# Patient Record
Sex: MALE | Race: BLACK OR AFRICAN AMERICAN | NOT HISPANIC OR LATINO | Employment: OTHER | URBAN - METROPOLITAN AREA
[De-identification: names, ages, dates, MRNs, and addresses within clinical notes are randomized per-mention and may not be internally consistent; named-entity substitution may affect disease eponyms.]

---

## 2018-10-31 ENCOUNTER — RECORDS - HEALTHEAST (OUTPATIENT)
Dept: LAB | Facility: CLINIC | Age: 63
End: 2018-10-31

## 2018-10-31 LAB
ANION GAP SERPL CALCULATED.3IONS-SCNC: 11 MMOL/L (ref 5–18)
BNP SERPL-MCNC: <10 PG/ML (ref 0–56)
BUN SERPL-MCNC: 9 MG/DL (ref 8–22)
CALCIUM SERPL-MCNC: 9.6 MG/DL (ref 8.5–10.5)
CHLORIDE BLD-SCNC: 104 MMOL/L (ref 98–107)
CO2 SERPL-SCNC: 21 MMOL/L (ref 22–31)
CREAT SERPL-MCNC: 0.83 MG/DL (ref 0.7–1.3)
GFR SERPL CREATININE-BSD FRML MDRD: >60 ML/MIN/1.73M2
GLUCOSE BLD-MCNC: 88 MG/DL (ref 70–125)
MAGNESIUM SERPL-MCNC: 2.3 MG/DL (ref 1.8–2.6)
POTASSIUM BLD-SCNC: 4.1 MMOL/L (ref 3.5–5)
SODIUM SERPL-SCNC: 136 MMOL/L (ref 136–145)

## 2018-11-01 ENCOUNTER — RECORDS - HEALTHEAST (OUTPATIENT)
Dept: ADMINISTRATIVE | Facility: OTHER | Age: 63
End: 2018-11-01

## 2018-11-01 ENCOUNTER — AMBULATORY - HEALTHEAST (OUTPATIENT)
Dept: CARDIOLOGY | Facility: CLINIC | Age: 63
End: 2018-11-01

## 2018-11-02 ENCOUNTER — RECORDS - HEALTHEAST (OUTPATIENT)
Dept: LAB | Facility: CLINIC | Age: 63
End: 2018-11-02

## 2018-11-02 LAB
FERRITIN SERPL-MCNC: 19 NG/ML (ref 27–300)
IRON SATN MFR SERPL: 10 % (ref 20–50)
IRON SERPL-MCNC: 44 UG/DL (ref 42–175)
TIBC SERPL-MCNC: 434 UG/DL (ref 313–563)
TRANSFERRIN SERPL-MCNC: 347 MG/DL (ref 212–360)

## 2018-11-21 ENCOUNTER — RECORDS - HEALTHEAST (OUTPATIENT)
Dept: LAB | Facility: CLINIC | Age: 63
End: 2018-11-21

## 2018-11-21 LAB — IRON SERPL-MCNC: 29 UG/DL (ref 42–175)

## 2018-11-22 LAB
BASOPHILS # BLD AUTO: 0 THOU/UL (ref 0–0.2)
BASOPHILS NFR BLD AUTO: 1 % (ref 0–2)
EOSINOPHIL # BLD AUTO: 0.1 THOU/UL (ref 0–0.4)
EOSINOPHIL NFR BLD AUTO: 2 % (ref 0–6)
ERYTHROCYTE [DISTWIDTH] IN BLOOD BY AUTOMATED COUNT: 14.5 % (ref 11–14.5)
HCT VFR BLD AUTO: 37 % (ref 40–54)
HGB BLD-MCNC: 11.9 G/DL (ref 14–18)
LYMPHOCYTES # BLD AUTO: 1.4 THOU/UL (ref 0.8–4.4)
LYMPHOCYTES NFR BLD AUTO: 28 % (ref 20–40)
MCH RBC QN AUTO: 27.7 PG (ref 27–34)
MCHC RBC AUTO-ENTMCNC: 32.2 G/DL (ref 32–36)
MCV RBC AUTO: 86 FL (ref 80–100)
MONOCYTES # BLD AUTO: 0.5 THOU/UL (ref 0–0.9)
MONOCYTES NFR BLD AUTO: 10 % (ref 2–10)
NEUTROPHILS # BLD AUTO: 2.9 THOU/UL (ref 2–7.7)
NEUTROPHILS NFR BLD AUTO: 59 % (ref 50–70)
PATH REPORT.MICROSCOPIC SPEC OTHER STN: ABNORMAL
PLATELET # BLD AUTO: 313 THOU/UL (ref 140–440)
PMV BLD AUTO: 10.2 FL (ref 8.5–12.5)
RBC # BLD AUTO: 4.29 MILL/UL (ref 4.4–6.2)
WBC: 4.9 THOU/UL (ref 4–11)

## 2018-11-23 ENCOUNTER — OFFICE VISIT - HEALTHEAST (OUTPATIENT)
Dept: CARDIOLOGY | Facility: CLINIC | Age: 63
End: 2018-11-23

## 2018-11-23 DIAGNOSIS — R07.9 CHEST PAIN, UNSPECIFIED TYPE: ICD-10-CM

## 2018-11-23 DIAGNOSIS — Z87.891 PERSONAL HISTORY OF TOBACCO USE, PRESENTING HAZARDS TO HEALTH: ICD-10-CM

## 2018-11-23 DIAGNOSIS — R06.09 DYSPNEA ON EXERTION: ICD-10-CM

## 2018-11-23 LAB
LAB AP CHARGES (HE HISTORICAL CONVERSION): NORMAL
PATH REPORT.COMMENTS IMP SPEC: NORMAL
PATH REPORT.COMMENTS IMP SPEC: NORMAL
PATH REPORT.FINAL DX SPEC: NORMAL
PATH REPORT.RELEVANT HX SPEC: NORMAL

## 2018-11-23 RX ORDER — ALBUTEROL SULFATE 90 UG/1
2 AEROSOL, METERED RESPIRATORY (INHALATION) DAILY PRN
Status: SHIPPED | COMMUNITY
Start: 2018-02-12

## 2018-11-23 RX ORDER — OMEPRAZOLE 40 MG/1
40 CAPSULE, DELAYED RELEASE ORAL DAILY
Refills: 4 | Status: SHIPPED | COMMUNITY
Start: 2018-11-21

## 2018-11-23 ASSESSMENT — MIFFLIN-ST. JEOR: SCORE: 1346.45

## 2018-11-26 ENCOUNTER — RECORDS - HEALTHEAST (OUTPATIENT)
Dept: LAB | Facility: CLINIC | Age: 63
End: 2018-11-26

## 2018-11-26 LAB — VIT B12 SERPL-MCNC: 409 PG/ML (ref 213–816)

## 2018-12-11 ENCOUNTER — HOSPITAL ENCOUNTER (OUTPATIENT)
Dept: NUCLEAR MEDICINE | Facility: CLINIC | Age: 63
Discharge: HOME | End: 2018-12-11
Attending: INTERNAL MEDICINE

## 2018-12-11 ENCOUNTER — HOSPITAL ENCOUNTER (OUTPATIENT)
Dept: CARDIOLOGY | Facility: CLINIC | Age: 63
Discharge: HOME | End: 2018-12-11
Attending: INTERNAL MEDICINE

## 2018-12-11 DIAGNOSIS — R06.09 DYSPNEA ON EXERTION: ICD-10-CM

## 2018-12-11 DIAGNOSIS — R07.9 CHEST PAIN, UNSPECIFIED TYPE: ICD-10-CM

## 2018-12-14 ENCOUNTER — HOSPITAL ENCOUNTER (OUTPATIENT)
Dept: NUCLEAR MEDICINE | Facility: CLINIC | Age: 63
Discharge: HOME | End: 2018-12-14
Attending: INTERNAL MEDICINE

## 2018-12-14 ENCOUNTER — HOSPITAL ENCOUNTER (OUTPATIENT)
Dept: CARDIOLOGY | Facility: CLINIC | Age: 63
Discharge: HOME | End: 2018-12-14
Attending: INTERNAL MEDICINE

## 2018-12-14 DIAGNOSIS — R06.09 DYSPNEA ON EXERTION: ICD-10-CM

## 2018-12-14 DIAGNOSIS — R07.9 CHEST PAIN, UNSPECIFIED TYPE: ICD-10-CM

## 2018-12-14 DIAGNOSIS — R06.09 OTHER FORMS OF DYSPNEA: ICD-10-CM

## 2018-12-14 LAB
CV STRESS CURRENT BP HE: NORMAL
CV STRESS CURRENT HR HE: 101
CV STRESS CURRENT HR HE: 73
CV STRESS CURRENT HR HE: 79
CV STRESS CURRENT HR HE: 80
CV STRESS CURRENT HR HE: 81
CV STRESS CURRENT HR HE: 83
CV STRESS CURRENT HR HE: 83
CV STRESS CURRENT HR HE: 84
CV STRESS CURRENT HR HE: 86
CV STRESS CURRENT HR HE: 93
CV STRESS CURRENT HR HE: 93
CV STRESS CURRENT HR HE: 94
CV STRESS CURRENT HR HE: 95
CV STRESS CURRENT HR HE: 96
CV STRESS CURRENT HR HE: 97
CV STRESS DEVIATION TIME HE: NORMAL
CV STRESS ECHO PERCENT HR HE: NORMAL
CV STRESS EXERCISE STAGE HE: NORMAL
CV STRESS EXERCISE STAGE REACHED HE: NORMAL
CV STRESS FINAL RESTING BP HE: NORMAL
CV STRESS FINAL RESTING HR HE: 79
CV STRESS MAX HR HE: 102
CV STRESS MAX TREADMILL GRADE HE: 0
CV STRESS MAX TREADMILL SPEED HE: 0
CV STRESS PEAK DIA BP HE: NORMAL
CV STRESS PEAK SYS BP HE: NORMAL
CV STRESS PHASE HE: NORMAL
CV STRESS PROTOCOL HE: NORMAL
CV STRESS RESTING PT POSITION HE: NORMAL
CV STRESS RESTING PT POSITION HE: NORMAL
CV STRESS ST DEVIATION AMOUNT HE: NORMAL
CV STRESS ST DEVIATION ELEVATION HE: NORMAL
CV STRESS ST EVELATION AMOUNT HE: NORMAL
CV STRESS TEST TYPE HE: NORMAL
CV STRESS TOTAL STAGE TIME MIN 1 HE: NORMAL
NUC STRESS EJECTION FRACTION: 75 %
STRESS ECHO BASELINE BP: NORMAL
STRESS ECHO CALCULATED PERCENT HR: 65 %
STRESS ECHO LAST STRESS BP: NORMAL
STRESS ECHO LAST STRESS HR: 93

## 2018-12-19 ENCOUNTER — AMBULATORY - HEALTHEAST (OUTPATIENT)
Dept: CARDIOLOGY | Facility: CLINIC | Age: 63
End: 2018-12-19

## 2018-12-19 DIAGNOSIS — R94.39 ABNORMAL CARDIOVASCULAR STRESS TEST: ICD-10-CM

## 2018-12-19 DIAGNOSIS — R07.89 CHEST WALL PAIN: ICD-10-CM

## 2018-12-19 RX ORDER — METOPROLOL SUCCINATE 25 MG/1
25 TABLET, EXTENDED RELEASE ORAL DAILY
Qty: 30 TABLET | Refills: 11 | Status: SHIPPED | OUTPATIENT
Start: 2018-12-19

## 2019-01-18 ENCOUNTER — AMBULATORY - HEALTHEAST (OUTPATIENT)
Dept: CARDIOLOGY | Facility: CLINIC | Age: 64
End: 2019-01-18

## 2019-01-18 ENCOUNTER — RECORDS - HEALTHEAST (OUTPATIENT)
Dept: ADMINISTRATIVE | Facility: OTHER | Age: 64
End: 2019-01-18

## 2019-01-22 ENCOUNTER — OFFICE VISIT - HEALTHEAST (OUTPATIENT)
Dept: CARDIOLOGY | Facility: CLINIC | Age: 64
End: 2019-01-22

## 2019-01-22 DIAGNOSIS — E78.5 HYPERLIPIDEMIA LDL GOAL <70: ICD-10-CM

## 2019-01-22 DIAGNOSIS — I25.118 CORONARY ARTERY DISEASE OF NATIVE ARTERY OF NATIVE HEART WITH STABLE ANGINA PECTORIS (H): ICD-10-CM

## 2019-01-22 RX ORDER — ATORVASTATIN CALCIUM 40 MG/1
40 TABLET, FILM COATED ORAL AT BEDTIME
Qty: 30 TABLET | Refills: 11 | Status: SHIPPED | OUTPATIENT
Start: 2019-01-22

## 2019-01-22 RX ORDER — PREDNISOLONE ACETATE 10 MG/ML
SUSPENSION/ DROPS OPHTHALMIC
Refills: 11 | Status: SHIPPED | COMMUNITY
Start: 2018-12-17

## 2019-01-22 ASSESSMENT — MIFFLIN-ST. JEOR: SCORE: 1341.91

## 2019-06-11 ENCOUNTER — COMMUNICATION - HEALTHEAST (OUTPATIENT)
Dept: ADMINISTRATIVE | Facility: CLINIC | Age: 64
End: 2019-06-11

## 2019-07-18 ENCOUNTER — RECORDS - HEALTHEAST (OUTPATIENT)
Dept: ADMINISTRATIVE | Facility: OTHER | Age: 64
End: 2019-07-18

## 2019-07-22 ENCOUNTER — OFFICE VISIT - HEALTHEAST (OUTPATIENT)
Dept: CARDIOLOGY | Facility: CLINIC | Age: 64
End: 2019-07-22

## 2019-07-22 DIAGNOSIS — I25.10 CORONARY ARTERY DISEASE INVOLVING NATIVE CORONARY ARTERY OF NATIVE HEART WITHOUT ANGINA PECTORIS: ICD-10-CM

## 2019-07-22 DIAGNOSIS — E78.5 HYPERLIPIDEMIA LDL GOAL <70: ICD-10-CM

## 2019-07-22 DIAGNOSIS — Z87.891 PERSONAL HISTORY OF TOBACCO USE, PRESENTING HAZARDS TO HEALTH: ICD-10-CM

## 2019-07-22 ASSESSMENT — MIFFLIN-ST. JEOR: SCORE: 1332.84

## 2019-11-18 ENCOUNTER — RECORDS - HEALTHEAST (OUTPATIENT)
Dept: ADMINISTRATIVE | Facility: OTHER | Age: 64
End: 2019-11-18

## 2019-11-20 ENCOUNTER — RECORDS - HEALTHEAST (OUTPATIENT)
Dept: ADMINISTRATIVE | Facility: OTHER | Age: 64
End: 2019-11-20

## 2020-03-13 ENCOUNTER — RECORDS - HEALTHEAST (OUTPATIENT)
Dept: LAB | Facility: CLINIC | Age: 65
End: 2020-03-13

## 2020-03-13 LAB
ALBUMIN SERPL-MCNC: 3.8 G/DL (ref 3.5–5)
ALP SERPL-CCNC: 92 U/L (ref 45–120)
ALT SERPL W P-5'-P-CCNC: 13 U/L (ref 0–45)
ANION GAP SERPL CALCULATED.3IONS-SCNC: 8 MMOL/L (ref 5–18)
AST SERPL W P-5'-P-CCNC: 20 U/L (ref 0–40)
BASOPHILS # BLD AUTO: 0 THOU/UL (ref 0–0.2)
BASOPHILS NFR BLD AUTO: 1 % (ref 0–2)
BILIRUB SERPL-MCNC: 0.3 MG/DL (ref 0–1)
BUN SERPL-MCNC: 8 MG/DL (ref 8–22)
CALCIUM SERPL-MCNC: 8.5 MG/DL (ref 8.5–10.5)
CHLORIDE BLD-SCNC: 104 MMOL/L (ref 98–107)
CO2 SERPL-SCNC: 24 MMOL/L (ref 22–31)
CREAT SERPL-MCNC: 0.78 MG/DL (ref 0.7–1.3)
EOSINOPHIL # BLD AUTO: 0.2 THOU/UL (ref 0–0.4)
EOSINOPHIL NFR BLD AUTO: 5 % (ref 0–6)
ERYTHROCYTE [DISTWIDTH] IN BLOOD BY AUTOMATED COUNT: 17.9 % (ref 11–14.5)
GFR SERPL CREATININE-BSD FRML MDRD: >60 ML/MIN/1.73M2
GLUCOSE BLD-MCNC: 84 MG/DL (ref 70–125)
HCT VFR BLD AUTO: 25.6 % (ref 40–54)
HGB BLD-MCNC: 7.5 G/DL (ref 14–18)
IRON SATN MFR SERPL: 2 % (ref 20–50)
IRON SERPL-MCNC: 7 UG/DL (ref 42–175)
LIPASE SERPL-CCNC: 45 U/L (ref 0–52)
LYMPHOCYTES # BLD AUTO: 1.2 THOU/UL (ref 0.8–4.4)
LYMPHOCYTES NFR BLD AUTO: 34 % (ref 20–40)
MCH RBC QN AUTO: 20.8 PG (ref 27–34)
MCHC RBC AUTO-ENTMCNC: 29.3 G/DL (ref 32–36)
MCV RBC AUTO: 71 FL (ref 80–100)
MONOCYTES # BLD AUTO: 0.4 THOU/UL (ref 0–0.9)
MONOCYTES NFR BLD AUTO: 12 % (ref 2–10)
NEUTROPHILS # BLD AUTO: 1.6 THOU/UL (ref 2–7.7)
NEUTROPHILS NFR BLD AUTO: 48 % (ref 50–70)
PLATELET # BLD AUTO: 304 THOU/UL (ref 140–440)
PMV BLD AUTO: 9.9 FL (ref 8.5–12.5)
POTASSIUM BLD-SCNC: 4.2 MMOL/L (ref 3.5–5)
PROT SERPL-MCNC: 6.7 G/DL (ref 6–8)
RBC # BLD AUTO: 3.61 MILL/UL (ref 4.4–6.2)
SODIUM SERPL-SCNC: 136 MMOL/L (ref 136–145)
TIBC SERPL-MCNC: 454 UG/DL (ref 313–563)
TRANSFERRIN SERPL-MCNC: 369 MG/DL (ref 212–360)
WBC: 3.4 THOU/UL (ref 4–11)

## 2020-03-18 ENCOUNTER — RECORDS - HEALTHEAST (OUTPATIENT)
Dept: LAB | Facility: CLINIC | Age: 65
End: 2020-03-18

## 2020-03-18 LAB — HGB BLD-MCNC: 9 G/DL (ref 14–18)

## 2020-03-19 LAB
GLIADIN IGA SER-ACNC: 0.7 U/ML
GLIADIN IGG SER-ACNC: 6.3 U/ML
IGA SERPL-MCNC: 229 MG/DL (ref 65–400)
TTG IGA SER-ACNC: 0.4 U/ML
TTG IGG SER-ACNC: <0.6 U/ML

## 2020-11-02 ENCOUNTER — RECORDS - HEALTHEAST (OUTPATIENT)
Dept: LAB | Facility: CLINIC | Age: 65
End: 2020-11-02

## 2020-11-02 LAB
CHOLEST SERPL-MCNC: 148 MG/DL
FASTING STATUS PATIENT QL REPORTED: NO
HDLC SERPL-MCNC: 47 MG/DL
IRON SATN MFR SERPL: 20 % (ref 20–50)
IRON SERPL-MCNC: 59 UG/DL (ref 42–175)
LDLC SERPL CALC-MCNC: 90 MG/DL
TIBC SERPL-MCNC: 295 UG/DL (ref 313–563)
TRANSFERRIN SERPL-MCNC: 236 MG/DL (ref 212–360)
TRIGL SERPL-MCNC: 53 MG/DL

## 2021-05-30 NOTE — PATIENT INSTRUCTIONS - HE
Below is a list of instructions we discussed today in clinic:   1. Continue taking your heart medications which should be the following  1. Atorvastatin 40 mg daily  2. Metoprolol succinate 25 mg daily  3. Aspirin 81 mg daily (baby aspirin)  2. If you do not have these medications at home your pharmacy should have an active prescription.  3. STOP smoking. This is the biggest harm to your health that you have at this time.  4. Follow up with me in about a year or sooner if needed.    You should receive a phone call from this office informing you of test or procedure results within 3 business days of the test being performed.  If you do not hear from our office with the test results within 1 week please do not hesitate to call asking for these results.     It was a pleasure to meet with you today in clinic.  Please do not hesitate to call the [facility] clinic with any questions or concerns at [phone].    Sincerely,     Mumtaz Bowens MD  Non-invasive Cardiology  [facility]

## 2021-06-02 ENCOUNTER — RECORDS - HEALTHEAST (OUTPATIENT)
Dept: ADMINISTRATIVE | Facility: CLINIC | Age: 66
End: 2021-06-02

## 2021-06-02 VITALS — BODY MASS INDEX: 18.81 KG/M2 | WEIGHT: 127 LBS | HEIGHT: 69 IN

## 2021-06-02 VITALS — HEIGHT: 69 IN | WEIGHT: 126 LBS | BODY MASS INDEX: 18.66 KG/M2

## 2021-06-03 VITALS — BODY MASS INDEX: 18.37 KG/M2 | WEIGHT: 124 LBS | HEIGHT: 69 IN

## 2021-06-16 PROBLEM — I49.9 CARDIAC ARRHYTHMIA: Status: ACTIVE | Noted: 2019-11-12

## 2021-06-16 PROBLEM — Z72.0 TOBACCO USER: Status: ACTIVE | Noted: 2019-11-12

## 2021-06-16 PROBLEM — K21.9 GASTROESOPHAGEAL REFLUX DISEASE WITHOUT ESOPHAGITIS: Status: ACTIVE | Noted: 2019-11-12

## 2021-06-19 NOTE — LETTER
Letter by Mumtaz Bowens MD at      Author: Mumtaz Bowens MD Service: -- Author Type: --    Filed:  Encounter Date: 6/11/2019 Status: (Other)         Leon Wayne  [address]  [place] [address]      June 11, 2019      Dear Leon,    This letter is to remind you that you will be due for your follow up appointment with Dr. Mumtaz Bowens. To help ensure you are in the best health possible, a regular follow-up with your cardiologist is essential.     Please call our Patient Scheduling Line at [phone] to schedule your appointment at your earliest convenience.  If you have recently scheduled an appointment, please disregard this letter.    We look forward to seeing you again. As always, we are available at the number  above for any questions or concerns you may have.      Sincerely,     The Physicians and Staff of [facility]

## 2021-06-23 NOTE — PATIENT INSTRUCTIONS - HE
Below is a list of instructions we discussed today in clinic:   1. Continue  Metoprolol  2. Start taking aspirin 81 mg once daily.  3. Start taking atorvastatin 40 mg once daily.  4. Come back to see me in 6 months or sooner if needed with a cholesterol test before your appointment.    It was a pleasure to meet with you today in clinic.  Please do not hesitate to call the [facility] clinic with any questions or concerns at [phone].    Sincerely,     Mumtaz Bowens MD  Non-invasive Cardiology  [facility]

## 2021-06-26 NOTE — PROGRESS NOTES
Progress Notes by Mumtaz Bowens MD at 11/23/2018  9:10 AM     Author: Mumtaz Bowens MD Service: -- Author Type: Physician    Filed: 11/23/2018 10:55 AM Encounter Date: 11/23/2018 Status: Signed    : Mumtaz Bowens MD (Physician)           Click to link to [facility]     [facility] HEART CARE NOTE    Thank you, Dr. Zelalem Hernandez MD, for asking the [facility] team to see Mr. Leon Wayne to evaluate Consult chest pain and abnormal ECG.      Assessment/Recommendations   Assessment:    1. Chest pain with abnormal ECG  2. Dyspnea on exertion  3. Ongoing tobacco use    Plan:  1. Exercise nuclear stress test  2. Recommend checking fasting lipids -the patient is not fasting this morning so I will defer this to Dr. Hernandez.  3. Recommended smoking cessation.  4. Follow up pending abnormal stress test. Would scheduled in clinic prior to direct referral for angiography due to language barrier.  5. If stress test is reassuring would pursue evaluation for COPD with pulmonary function testing.             History of Present Illness   Mr. Leon Wayne is a 63 y.o. male with a significant past history of tobacco use and COPD who presents for evaluation of chest pain and an abnormal ECG.     Mr. Wayne presents with a professional [employer] . The patient's english is quite good and he understands most of the conversation without translation necessary. He reports that his chest pain started a few months ago. It is described as burning.  He is short of breath with climbing 1-2 flights of stairs, however he does not necessarily get chest pain with the exertion.  Over the past couple months he is feeling better now than he did when the discomfort started with fewer symptoms currently.  The pain is not radiating.  Not associated with lightheadedness or dizziness, palpitations, or nausea.  He does smoke about a pack a day and has done so for more than 40 years.  He does not  have any known cardiac disease previously diagnosed.      Other than noted above, Mr. Wayne denies any chest pain/pressure/tightness, shortness of breath at rest or with exertion, light headedness/dizziness, pre-syncope, syncope, lower extremity swelling, palpitations, paroxysmal nocturnal dyspnea (PND), or orthopnea.     Cardiac Problems and Cardiac Diagnostics   Most Recent Cardiac testing:  ECG dated 10/31/18 (personaly reviewed and interpreted): sinus rhythm with nonspecific T wave changes in lead V2.    ECHO (report reviewed): no prior echocardiogram.  Echo results:      Stress test: no prior stress test         Medications  Allergies   Current Outpatient Medications   Medication Sig Dispense Refill   ? albuterol (PROAIR HFA;PROVENTIL HFA;VENTOLIN HFA) 90 mcg/actuation inhaler Inhale 2 puffs daily as needed.     ? aspirin-acetaminophen-caffeine (EXCEDRIN MIGRAINE) 250-250-65 mg per tablet Take 1 tablet by mouth every 6 (six) hours as needed for pain.     ? omeprazole (PRILOSEC) 40 MG capsule Take 40 mg by mouth daily.  4   ? dextromethorphan (DELSYM) 30 mg/5 mL liquid Take 10 mL (60 mg total) by mouth 2 (two) times a day. 148 mL 0   ? naproxen (NAPROSYN) 375 MG tablet Take 1 tablet (375 mg total) by mouth 2 (two) times a day with meals. 20 tablet 0   ? oxyCODONE-acetaminophen (PERCOCET/ENDOCET) 5-325 mg per tablet Take 1 tablet by mouth every 4 (four) hours as needed for pain. 13 tablet 0     No current facility-administered medications for this visit.       No Known Allergies     Physical Examination Review of Systems   Vitals:    11/23/18 0921   BP: 144/80   Pulse: 66   Resp: 16     Body mass index is 18.75 kg/m .  Wt Readings from Last 3 Encounters:   11/23/18 127 lb (57.6 kg)   10/19/18 120 lb (54.4 kg)       General Appearance:   Pleasant  male, appears older than stated age. no acute distress, thin body habitus   ENT/Mouth: membranes moist, no apparent gingival bleeding.      EYES:  no scleral icterus,  normal conjunctivae   Neck: no carotid bruits. No anterior cervical lymphadenopaty   Respiratory:   lungs are clear to auscultation, no rales or wheezing, equal chest wall expansion    Cardiovascular:   Regular rhythm, normal rate. Normal first and second heart sounds with no murmurs, rubs, or gallops; the carotid, radial and posterior tibial pulses are intact, Jugular venous pressure normal, no edema bilaterally    Abdomen/GI:  no organomegaly, masses, bruits, or tenderness; bowel sounds are present   Extremities: no cyanosis or clubbing   Skin: no xanthelasma, warm.    Heme/lymph/ Immunology No apparent bleeding noted.   Neurologic: Alert and oriented. normal gait, no tremors     Psychiatric: Pleasant, calm, appropriate affect.    A complete 10 system review of systems was performed and is negative except as mentioned in the HPI or below:  General: WNL  Eyes: WNL  Ears/Nose/Throat: WNL  Lungs: Shortness of Breath, Wheezing  Heart: Chest Pain, Shortness of Breath with activity, Irregular Heartbeat  Stomach: Constipation, Heartburn  Bladder: WNL  Muscle/Joints: WNL  Skin: WNL  Nervous System: WNL  Mental Health: WNL     Blood: WNL       Past History   Past Medical History:   Past Medical History:   Diagnosis Date   ? COPD (chronic obstructive pulmonary disease) (H)        Past Surgical History:   Past Surgical History:   Procedure Laterality Date   ? EYE SURGERY Left        Family History:   Family History   Problem Relation Age of Onset   ? No Medical Problems Mother    ? No Medical Problems Father        Social History:   Social History     Socioeconomic History   ? Marital status: Single     Spouse name: Not on file   ? Number of children: Not on file   ? Years of education: Not on file   ? Highest education level: Not on file   Social Needs   ? Financial resource strain: Not on file   ? Food insecurity - worry: Not on file   ? Food insecurity - inability: Not on file   ? Transportation needs - medical: Not on  file   ? Transportation needs - non-medical: Not on file   Occupational History   ? Not on file   Tobacco Use   ? Smoking status: Not on file   Substance and Sexual Activity   ? Alcohol use: Not on file   ? Drug use: Not on file   ? Sexual activity: Not on file   Other Topics Concern   ? Not on file   Social History Narrative   ? Not on file              Lab Results    Chemistry/lipid CBC Cardiac Enzymes/BNP/TSH/INR   Lab Results   Component Value Date    CHOL 158 09/21/2010    HDL 50 09/21/2010    LDLCALC 85.8 09/21/2010    TRIG 111 09/21/2010    CREATININE 0.83 10/31/2018    BUN 9 10/31/2018    K 4.1 10/31/2018     10/31/2018     10/31/2018    CO2 21 (L) 10/31/2018    Lab Results   Component Value Date    WBC 4.9 11/21/2018    HGB 11.9 (L) 11/21/2018    HCT 37.0 (L) 11/21/2018    MCV 86 11/21/2018     11/21/2018    Lab Results   Component Value Date    BNP <10 10/31/2018    TSH 2.15 09/21/2010          Mumtaz Bowens MD  Non-invasive Cardiology  [facility]

## 2021-06-27 NOTE — PROGRESS NOTES
Progress Notes by Mumtaz Bowens MD at 1/22/2019 10:10 AM     Author: Mumtaz Bowens MD Service: -- Author Type: Physician    Filed: 1/22/2019 11:30 AM Encounter Date: 1/22/2019 Status: Signed    : Mumtaz Bowens MD (Physician)           Click to link to [facility]     [facility] HEART CARE NOTE    Thank you, Zelalem Coleman MD, for allowing the [facility] team to continue to follow Mr. Leon Wayne in Follow-up for his coronary artery disease.      Assessment/Recommendations   Assessment:    1. Coronary artery disease with low-risk but abnormal pharmacologic nuclear stress test.  I am considering his dyspnea on exertion in part an anginal equivalent  2. Dyspnea on exertion -likely multifactorial as an anginal equivalent as well as with chronic obstructive pulmonary disease.  3. Tobacco abuse -again discussed and strongly recommended tobacco cessation.  4. hyperlipidemia    Plan:  1. Aspirin 81 mg daily  2. Continue metoprolol  3. Start atorvastatin 40 mg daily  4. Check fasting lipids and ALT in 6 months  5. Follow up in 6 months.           History of Present Illness   Mr. Leon Wayne is a 64 y.o. male with a significant past history of chronic tobacco use and coronary artery diseas who presents for follow-up of a recent abnormal stress test.    I last saw Mr. Wayne on initial consult for exertional dyspnea and chest discomfort. We ordered an exercise nuclear stress test, but this was converted to a pharmacologic test due to significant limitations in exercise and failure to achieved target heart rate. The stress test was mildly abnormal and low risk so medical therapy with aspirin and metoprolol was initiated.     Today, Mr. Wayne presents with a life professional [employer] .  He reports somewhat improved dyspnea on exertion.  This did get a little better after starting the metoprolol.  He has not been taking the aspirin due to questions about whether  it would be too much to take daily aspirin in addition to as needed Excedrin which contains aspirin.  He continues to smoke but indicated that he is willing to consider quitting if recommended.    Other than noted above, Mr. Wayne denies any chest pain/pressure/tightness, shortness of breath at rest or with exertion, light headedness/dizziness, pre-syncope, syncope, lower extremity swelling, palpitations, paroxysmal nocturnal dyspnea (PND), or orthopnea.     Cardiac Problems and Cardiac Diagnostics     Most Recent Cardiac testing:    ECHO (report reviewed): no prior echo  Echo results:      Stress test: dated 12/14/18 revealed     The exercise nuclear stress test is abnormal.    Stress nuclear images reveal a small area of distal inferolateral ischemia, although diaphragmatic attenuation reduces specificity.    The left ventricular ejection fraction is 75% without wall motion abnormality.    There is no prior study available.       Medications  Allergies   Current Outpatient Medications   Medication Sig Dispense Refill   ? albuterol (PROAIR HFA;PROVENTIL HFA;VENTOLIN HFA) 90 mcg/actuation inhaler Inhale 2 puffs daily as needed.     ? aspirin 81 MG EC tablet Take 1 tablet (81 mg total) by mouth daily.  0   ? aspirin-acetaminophen-caffeine (EXCEDRIN MIGRAINE) 250-250-65 mg per tablet Take 1 tablet by mouth every 6 (six) hours as needed for pain.     ? fluticasone furoate 100 mcg/actuation DsDv Inhale 100 mcg daily.     ? metoprolol succinate (TOPROL XL) 25 MG Take 1 tablet (25 mg total) by mouth daily. 30 tablet 11   ? omeprazole (PRILOSEC) 40 MG capsule Take 40 mg by mouth daily.  4   ? prednisoLONE acetate (PRED-FORTE) 1 % ophthalmic suspension SHAKE LQ AND INT 1 GTT IN OS BID  11   ? dextromethorphan (DELSYM) 30 mg/5 mL liquid Take 10 mL (60 mg total) by mouth 2 (two) times a day. 148 mL 0   ? naproxen (NAPROSYN) 375 MG tablet Take 1 tablet (375 mg total) by mouth 2 (two) times a day with meals. 20 tablet 0   ?  oxyCODONE-acetaminophen (PERCOCET/ENDOCET) 5-325 mg per tablet Take 1 tablet by mouth every 4 (four) hours as needed for pain. 13 tablet 0     No current facility-administered medications for this visit.       No Known Allergies     Physical Examination Review of Systems   Vitals:    01/22/19 1033   BP: 108/70   Pulse: 96   Resp: 16     Body mass index is 18.61 kg/m .  Wt Readings from Last 3 Encounters:   01/22/19 126 lb (57.2 kg)   11/23/18 127 lb (57.6 kg)   10/19/18 120 lb (54.4 kg)       General Appearance:   Pleasant male, appears older than stated age. no acute distress, thin body habitus   ENT/Mouth: membranes moist, no apparent gingival bleeding.      EYES:  no scleral icterus, normal conjunctivae   Neck: supple   Respiratory:   lungs are clear to auscultation, no rales or wheezing, equal chest wall expansion    Cardiovascular:   Regular rhythm, normal rate. Normal first and second heart sounds with no murmurs, rubs, or gallops; Jugular venous pressure normal, no edema bilaterally    Abdomen/GI:  no organomegaly, masses, bruits, or tenderness; bowel sounds are present   Extremities: no cyanosis or clubbing   Skin: no xanthelasma, warm.    Heme/lymph/ Immunology No apparent bleeding noted.   Neurologic: Alert and oriented. normal gait, no tremors     Psychiatric: Pleasant, calm, appropriate affect.    A complete 10 system review of systems was performed and is negative except as mentioned in the HPI or below:  General: WNL  Eyes: WNL  Ears/Nose/Throat: WNL  Lungs: WNL  Heart: WNL  Stomach: Heartburn  Bladder: WNL  Muscle/Joints: WNL  Skin: WNL  Nervous System: WNL  Mental Health: WNL     Blood: WNL       Past History   Past Medical History:   Past Medical History:   Diagnosis Date   ? COPD (chronic obstructive pulmonary disease) (H)        Past Surgical History:   Past Surgical History:   Procedure Laterality Date   ? EYE SURGERY Left        Family History:   Family History   Problem Relation Age of Onset    ? No Medical Problems Mother    ? No Medical Problems Father         Social History:   Social History     Socioeconomic History   ? Marital status: Single     Spouse name: Not on file   ? Number of children: Not on file   ? Years of education: Not on file   ? Highest education level: Not on file   Social Needs   ? Financial resource strain: Not on file   ? Food insecurity - worry: Not on file   ? Food insecurity - inability: Not on file   ? Transportation needs - medical: Not on file   ? Transportation needs - non-medical: Not on file   Occupational History   ? Not on file   Tobacco Use   ? Smoking status: Current Every Day Smoker   ? Smokeless tobacco: Never Used   Substance and Sexual Activity   ? Alcohol use: Not on file   ? Drug use: Not on file   ? Sexual activity: Not on file   Other Topics Concern   ? Not on file   Social History Narrative   ? Not on file              Lab Results    Chemistry/lipid CBC Cardiac Enzymes/BNP/TSH/INR   Lab Results   Component Value Date    CHOL 158 09/21/2010    HDL 50 09/21/2010    LDLCALC 85.8 09/21/2010    TRIG 111 09/21/2010    CREATININE 0.83 10/31/2018    BUN 9 10/31/2018    K 4.1 10/31/2018     10/31/2018     10/31/2018    CO2 21 (L) 10/31/2018    Lab Results   Component Value Date    WBC 4.9 11/21/2018    HGB 11.9 (L) 11/21/2018    HCT 37.0 (L) 11/21/2018    MCV 86 11/21/2018     11/21/2018    Lab Results   Component Value Date    BNP <10 10/31/2018    TSH 2.15 09/21/2010          Mumtaz Bowens MD  Non-invasive Cardiology  [facility]

## 2021-06-27 NOTE — PROGRESS NOTES
Progress Notes by Mumtaz Bowens MD at 7/22/2019  1:50 PM     Author: Mumtaz Bowens MD Service: -- Author Type: Physician    Filed: 7/22/2019  2:15 PM Encounter Date: 7/22/2019 Status: Signed    : Mumtaz Bowens MD (Physician)           Click to link to [facility]     [facility] NOTE    Thank you, Zelalem Coleman MD, for allowing the [facility] team to continue to follow Mr. Leon Wayne in Follow-up for his coronary artery disease.      Assessment/Recommendations   Assessment:    1. Coronary artery disease with low-risk but abnormal pharmacologic nuclear stress test.  Currently asymptomatic after initiation of metoprolol  2. Tobacco abuse -again discussed and strongly recommended tobacco cessation.  3. Hyperlipidemia - has not been taking atorvastatin as he was unaware of needing refills.    Plan:  1. Continue Aspirin 81 mg daily  2. Continue metoprolol  3. resume atorvastatin 40 mg daily  4. Advised tobacco cessation.  5. Follow up in 1 year or sooner if needed.    A professional Zymer[employer]  was used for today's encounter.         History of Present Illness   Mr. Leon Wayne is a 64 y.o. male with a significant past history of chronic tobacco use and coronary artery diseas who presents for follow-up of coronary artery disease.    Deyanira Wayne has presumed coronary artery disease with a low risk abnormal stress test and symptoms of exertional dyspnea. We are treating his CAD medically. I last saw him 6 months ago at which time his symptoms were stable.     Today, Mr. Wayne states he is feeling well. His dyspnea on exertion markedly improved after starting metoprolol. He ran out of atorvastatin and was unaware that he had refills so has not taken this in a few months.    Other than noted above, Mr. Wayne denies any chest pain/pressure/tightness, shortness of breath at rest or with exertion, light headedness/dizziness, pre-syncope, syncope, lower  extremity swelling, palpitations, paroxysmal nocturnal dyspnea (PND), or orthopnea.     Cardiac Problems and Cardiac Diagnostics     Most Recent Cardiac testing:    ECHO (report reviewed): no prior echo  Echo results:      Stress test: dated 12/14/18 revealed     The exercise nuclear stress test is abnormal.    Stress nuclear images reveal a small area of distal inferolateral ischemia, although diaphragmatic attenuation reduces specificity.    The left ventricular ejection fraction is 75% without wall motion abnormality.    There is no prior study available.       Medications  Allergies   Current Outpatient Medications   Medication Sig Dispense Refill   ? albuterol (PROAIR HFA;PROVENTIL HFA;VENTOLIN HFA) 90 mcg/actuation inhaler Inhale 2 puffs daily as needed.     ? aspirin 81 MG EC tablet Take 1 tablet (81 mg total) by mouth daily.  0   ? aspirin-acetaminophen-caffeine (EXCEDRIN MIGRAINE) 250-250-65 mg per tablet Take 1 tablet by mouth every 6 (six) hours as needed for pain.     ? atorvastatin (LIPITOR) 40 MG tablet Take 1 tablet (40 mg total) by mouth at bedtime. 30 tablet 11   ? dextromethorphan (DELSYM) 30 mg/5 mL liquid Take 10 mL (60 mg total) by mouth 2 (two) times a day. 148 mL 0   ? fluticasone furoate 100 mcg/actuation DsDv Inhale 100 mcg daily.     ? metoprolol succinate (TOPROL XL) 25 MG Take 1 tablet (25 mg total) by mouth daily. 30 tablet 11   ? naproxen (NAPROSYN) 375 MG tablet Take 1 tablet (375 mg total) by mouth 2 (two) times a day with meals. 20 tablet 0   ? omeprazole (PRILOSEC) 40 MG capsule Take 40 mg by mouth daily.  4   ? oxyCODONE-acetaminophen (PERCOCET/ENDOCET) 5-325 mg per tablet Take 1 tablet by mouth every 4 (four) hours as needed for pain. 13 tablet 0   ? prednisoLONE acetate (PRED-FORTE) 1 % ophthalmic suspension SHAKE LQ AND INT 1 GTT IN OS BID  11     No current facility-administered medications for this visit.       No Known Allergies     Physical Examination Review of Systems    Vitals:    07/22/19 1357   BP: 122/78   Pulse: 76   Resp: 14     Body mass index is 18.31 kg/m .  Wt Readings from Last 3 Encounters:   07/22/19 124 lb (56.2 kg)   01/22/19 126 lb (57.2 kg)   11/23/18 127 lb (57.6 kg)       General Appearance:   Pleasant male, appears older than stated age. no acute distress, thin body habitus   ENT/Mouth: membranes moist, no apparent gingival bleeding.      EYES:  no scleral icterus, normal conjunctivae   Neck: supple   Respiratory:   lungs are clear to auscultation, no rales or wheezing, equal chest wall expansion    Cardiovascular:   Normal rate, regular rhythm with occasional extrasystoles. Normal first and second heart sounds with no murmurs or rubs. Jugular venous pressure normal, no edema bilaterally    Abdomen/GI:  Soft, non-tender   Extremities: no cyanosis or clubbing   Skin: no xanthelasma, warm.    Heme/lymph/ Immunology No apparent bleeding noted.   Neurologic: Alert and oriented. normal gait, no tremors     Psychiatric: Pleasant, calm, appropriate affect.    A complete 10 system review of systems was performed and is negative except as mentioned in the HPI or below:  General: WNL  Eyes: WNL  Ears/Nose/Throat: WNL  Lungs: WNL  Heart: WNL  Stomach: Heartburn  Bladder: WNL  Muscle/Joints: WNL  Skin: WNL  Nervous System: WNL  Mental Health: WNL     Blood: WNL       Past History   Past Medical History:   Past Medical History:   Diagnosis Date   ? COPD (chronic obstructive pulmonary disease) (H)        Past Surgical History:   Past Surgical History:   Procedure Laterality Date   ? EYE SURGERY Left        Family History:   Family History   Problem Relation Age of Onset   ? No Medical Problems Mother    ? No Medical Problems Father         Social History:   Social History     Socioeconomic History   ? Marital status: Single     Spouse name: Not on file   ? Number of children: Not on file   ? Years of education: Not on file   ? Highest education level: Not on file    Occupational History   ? Not on file   Social Needs   ? Financial resource strain: Not on file   ? Food insecurity:     Worry: Not on file     Inability: Not on file   ? Transportation needs:     Medical: Not on file     Non-medical: Not on file   Tobacco Use   ? Smoking status: Current Every Day Smoker   ? Smokeless tobacco: Never Used   Substance and Sexual Activity   ? Alcohol use: Not on file   ? Drug use: Not on file   ? Sexual activity: Not on file   Lifestyle   ? Physical activity:     Days per week: Not on file     Minutes per session: Not on file   ? Stress: Not on file   Relationships   ? Social connections:     Talks on phone: Not on file     Gets together: Not on file     Attends [employer] service: Not on file     Active member of club or organization: Not on file     Attends meetings of clubs or organizations: Not on file     Relationship status: Not on file   ? Intimate partner violence:     Fear of current or ex partner: Not on file     Emotionally abused: Not on file     Physically abused: Not on file     Forced sexual activity: Not on file   Other Topics Concern   ? Not on file   Social History Narrative   ? Not on file              Lab Results    Chemistry/lipid CBC Cardiac Enzymes/BNP/TSH/INR   Lab Results   Component Value Date    CHOL 158 09/21/2010    HDL 50 09/21/2010    LDLCALC 85.8 09/21/2010    TRIG 111 09/21/2010    CREATININE 0.83 10/31/2018    BUN 9 10/31/2018    K 4.1 10/31/2018     10/31/2018     10/31/2018    CO2 21 (L) 10/31/2018    Lab Results   Component Value Date    WBC 4.9 11/21/2018    HGB 11.9 (L) 11/21/2018    HCT 37.0 (L) 11/21/2018    MCV 86 11/21/2018     11/21/2018    Lab Results   Component Value Date    BNP <10 10/31/2018    TSH 2.15 09/21/2010          Mumtaz Bowens MD  Non-invasive Cardiology  [facility]

## 2024-09-06 ENCOUNTER — APPOINTMENT (OUTPATIENT)
Dept: GENERAL RADIOLOGY | Facility: CLINIC | Age: 69
End: 2024-09-06
Attending: EMERGENCY MEDICINE
Payer: MEDICARE

## 2024-09-06 ENCOUNTER — HOSPITAL ENCOUNTER (EMERGENCY)
Facility: CLINIC | Age: 69
Discharge: HOME | End: 2024-09-06
Attending: EMERGENCY MEDICINE | Admitting: EMERGENCY MEDICINE
Payer: MEDICARE

## 2024-09-06 ENCOUNTER — APPOINTMENT (OUTPATIENT)
Dept: CT IMAGING | Facility: CLINIC | Age: 69
End: 2024-09-06
Attending: EMERGENCY MEDICINE
Payer: MEDICARE

## 2024-09-06 VITALS
SYSTOLIC BLOOD PRESSURE: 138 MMHG | DIASTOLIC BLOOD PRESSURE: 88 MMHG | HEART RATE: 89 BPM | OXYGEN SATURATION: 97 % | RESPIRATION RATE: 20 BRPM | TEMPERATURE: 98 F

## 2024-09-06 DIAGNOSIS — K59.00 CONSTIPATION, UNSPECIFIED CONSTIPATION TYPE: ICD-10-CM

## 2024-09-06 DIAGNOSIS — U07.1 COVID-19: ICD-10-CM

## 2024-09-06 LAB
ALBUMIN SERPL BCG-MCNC: 4.3 G/DL (ref 3.5–5.2)
ALP SERPL-CCNC: 95 U/L (ref 40–150)
ALT SERPL W P-5'-P-CCNC: 15 U/L (ref 0–70)
ANION GAP SERPL CALCULATED.3IONS-SCNC: 11 MMOL/L (ref 7–15)
AST SERPL W P-5'-P-CCNC: 21 U/L (ref 0–45)
BASOPHILS # BLD AUTO: 0 10E3/UL (ref 0–0.2)
BASOPHILS NFR BLD AUTO: 1 %
BILIRUB SERPL-MCNC: 0.6 MG/DL
BUN SERPL-MCNC: 10.5 MG/DL (ref 8–23)
CALCIUM SERPL-MCNC: 8.9 MG/DL (ref 8.8–10.4)
CHLORIDE SERPL-SCNC: 100 MMOL/L (ref 98–107)
CREAT SERPL-MCNC: 0.81 MG/DL (ref 0.67–1.17)
EGFRCR SERPLBLD CKD-EPI 2021: >90 ML/MIN/1.73M2
EOSINOPHIL # BLD AUTO: 0 10E3/UL (ref 0–0.7)
EOSINOPHIL NFR BLD AUTO: 0 %
ERYTHROCYTE [DISTWIDTH] IN BLOOD BY AUTOMATED COUNT: 13.4 % (ref 10–15)
FLUAV RNA SPEC QL NAA+PROBE: NEGATIVE
FLUBV RNA RESP QL NAA+PROBE: NEGATIVE
GLUCOSE SERPL-MCNC: 104 MG/DL (ref 70–99)
HCO3 SERPL-SCNC: 24 MMOL/L (ref 22–29)
HCT VFR BLD AUTO: 41.8 % (ref 40–53)
HGB BLD-MCNC: 14.1 G/DL (ref 13.3–17.7)
IMM GRANULOCYTES # BLD: 0 10E3/UL
IMM GRANULOCYTES NFR BLD: 0 %
LYMPHOCYTES # BLD AUTO: 0.6 10E3/UL (ref 0.8–5.3)
LYMPHOCYTES NFR BLD AUTO: 19 %
MCH RBC QN AUTO: 30.5 PG (ref 26.5–33)
MCHC RBC AUTO-ENTMCNC: 33.7 G/DL (ref 31.5–36.5)
MCV RBC AUTO: 90 FL (ref 78–100)
MONOCYTES # BLD AUTO: 0.5 10E3/UL (ref 0–1.3)
MONOCYTES NFR BLD AUTO: 15 %
NEUTROPHILS # BLD AUTO: 2.2 10E3/UL (ref 1.6–8.3)
NEUTROPHILS NFR BLD AUTO: 66 %
NRBC # BLD AUTO: 0 10E3/UL
NRBC BLD AUTO-RTO: 0 /100
PLATELET # BLD AUTO: 176 10E3/UL (ref 150–450)
POTASSIUM SERPL-SCNC: 3.9 MMOL/L (ref 3.4–5.3)
PROT SERPL-MCNC: 7.8 G/DL (ref 6.4–8.3)
RBC # BLD AUTO: 4.63 10E6/UL (ref 4.4–5.9)
RSV RNA SPEC NAA+PROBE: NEGATIVE
SARS-COV-2 RNA RESP QL NAA+PROBE: POSITIVE
SODIUM SERPL-SCNC: 135 MMOL/L (ref 135–145)
TROPONIN T SERPL HS-MCNC: 10 NG/L
WBC # BLD AUTO: 3.3 10E3/UL (ref 4–11)

## 2024-09-06 PROCEDURE — 99285 EMERGENCY DEPT VISIT HI MDM: CPT | Mod: 25

## 2024-09-06 PROCEDURE — 96360 HYDRATION IV INFUSION INIT: CPT

## 2024-09-06 PROCEDURE — 82040 ASSAY OF SERUM ALBUMIN: CPT | Performed by: EMERGENCY MEDICINE

## 2024-09-06 PROCEDURE — 258N000003 HC RX IP 258 OP 636: Performed by: EMERGENCY MEDICINE

## 2024-09-06 PROCEDURE — 93005 ELECTROCARDIOGRAM TRACING: CPT

## 2024-09-06 PROCEDURE — 84484 ASSAY OF TROPONIN QUANT: CPT | Performed by: EMERGENCY MEDICINE

## 2024-09-06 PROCEDURE — 71046 X-RAY EXAM CHEST 2 VIEWS: CPT

## 2024-09-06 PROCEDURE — 85025 COMPLETE CBC W/AUTO DIFF WBC: CPT | Performed by: EMERGENCY MEDICINE

## 2024-09-06 PROCEDURE — 87637 SARSCOV2&INF A&B&RSV AMP PRB: CPT | Performed by: EMERGENCY MEDICINE

## 2024-09-06 PROCEDURE — 36415 COLL VENOUS BLD VENIPUNCTURE: CPT | Performed by: EMERGENCY MEDICINE

## 2024-09-06 PROCEDURE — 96361 HYDRATE IV INFUSION ADD-ON: CPT

## 2024-09-06 PROCEDURE — 80053 COMPREHEN METABOLIC PANEL: CPT | Performed by: EMERGENCY MEDICINE

## 2024-09-06 PROCEDURE — 70450 CT HEAD/BRAIN W/O DYE: CPT

## 2024-09-06 RX ORDER — POLYETHYLENE GLYCOL 3350 17 G/17G
1 POWDER, FOR SOLUTION ORAL DAILY
Qty: 527 G | Refills: 0 | Status: SHIPPED | OUTPATIENT
Start: 2024-09-06 | End: 2024-10-06

## 2024-09-06 RX ORDER — AMOXICILLIN 250 MG
1 CAPSULE ORAL DAILY
Qty: 30 TABLET | Refills: 0 | Status: SHIPPED | OUTPATIENT
Start: 2024-09-06

## 2024-09-06 RX ADMIN — SODIUM CHLORIDE 1000 ML: 9 INJECTION, SOLUTION INTRAVENOUS at 21:31

## 2024-09-06 ASSESSMENT — ACTIVITIES OF DAILY LIVING (ADL)
ADLS_ACUITY_SCORE: 35

## 2024-09-06 ASSESSMENT — COLUMBIA-SUICIDE SEVERITY RATING SCALE - C-SSRS
2. HAVE YOU ACTUALLY HAD ANY THOUGHTS OF KILLING YOURSELF IN THE PAST MONTH?: NO
1. IN THE PAST MONTH, HAVE YOU WISHED YOU WERE DEAD OR WISHED YOU COULD GO TO SLEEP AND NOT WAKE UP?: NO
6. HAVE YOU EVER DONE ANYTHING, STARTED TO DO ANYTHING, OR PREPARED TO DO ANYTHING TO END YOUR LIFE?: NO

## 2024-09-06 NOTE — ED TRIAGE NOTES
C/o weakness, headache, decrease PO, vomits when he eats; constipation. Sx has been going on for 5-7 days

## 2024-09-07 LAB
ATRIAL RATE - MUSE: 92 BPM
DIASTOLIC BLOOD PRESSURE - MUSE: NORMAL MMHG
INTERPRETATION ECG - MUSE: NORMAL
P AXIS - MUSE: 86 DEGREES
PR INTERVAL - MUSE: 160 MS
QRS DURATION - MUSE: 72 MS
QT - MUSE: 338 MS
QTC - MUSE: 417 MS
R AXIS - MUSE: 86 DEGREES
SYSTOLIC BLOOD PRESSURE - MUSE: NORMAL MMHG
T AXIS - MUSE: 92 DEGREES
VENTRICULAR RATE- MUSE: 92 BPM

## 2024-09-07 NOTE — ED PROVIDER NOTES
Emergency Department Note      History of Present Illness     Chief Complaint   Generalized Weakness      HPI   Leon Wayne is a 69 year old male with a history of COPD who presents with his family for a complaint of generalized weakness. The patient's family translated the following history from [place]. The patient reports that he has had a constant headache for the last 4 days. He has also been constipated and unable to eat, and endorses body aches. He denies any fevers. His family reports that they heard him coughing last night. He does have some constipation at baseline. His family denies that he has taken any medications for the constipation. They deny that he uses oxygen at home.     Independent Historian   Family as detailed above.    Review of External Notes   Clinic notes    Past Medical History     Medical History and Problem List   COPD  Cardiac arrhythmia  GERD  Tobacco use     Medications  aspirin 81 MG EC tablet  atorvastatin  famotidine   fluticasone furoate   metoprolol  naproxen   omeprazole     Surgical History  Left eye surgery   Physical Exam     Patient Vitals for the past 24 hrs:   BP Temp Temp src Pulse Resp SpO2   09/06/24 2300 -- -- -- -- 20 97 %   09/06/24 2200 138/88 98  F (36.7  C) Temporal 89 18 98 %   09/06/24 2125 (!) 136/93 -- -- 95 20 99 %   09/06/24 1933 126/73 -- -- 90 -- 97 %     Physical Exam  GENERAL: well developed, pleasant  HEAD: atraumatic  EYES: pupils reactive, extraocular muscles intact, conjunctivae normal  ENT:  mucus membranes moist  NECK:  trachea midline, normal range of motion  RESPIRATORY: no tachypnea, hoarse breath sounds in the bases.   CVS: normal S1/S2, no murmurs, intact distal pulses  ABDOMEN: soft, nontender, nondistention  MUSCULOSKELETAL: no deformities  SKIN: warm and dry, no acute rashes or ulceration  NEURO: GCS 15, cranial nerves intact, alert and oriented x3  PSYCH:  Mood/affect normal   Diagnostics     Lab Results   Labs Ordered and Resulted  from Time of ED Arrival to Time of ED Departure   COMPREHENSIVE METABOLIC PANEL - Abnormal       Result Value    Sodium 135      Potassium 3.9      Carbon Dioxide (CO2) 24      Anion Gap 11      Urea Nitrogen 10.5      Creatinine 0.81      GFR Estimate >90      Calcium 8.9      Chloride 100      Glucose 104 (*)     Alkaline Phosphatase 95      AST 21      ALT 15      Protein Total 7.8      Albumin 4.3      Bilirubin Total 0.6     CBC WITH PLATELETS AND DIFFERENTIAL - Abnormal    WBC Count 3.3 (*)     RBC Count 4.63      Hemoglobin 14.1      Hematocrit 41.8      MCV 90      MCH 30.5      MCHC 33.7      RDW 13.4      Platelet Count 176      % Neutrophils 66      % Lymphocytes 19      % Monocytes 15      % Eosinophils 0      % Basophils 1      % Immature Granulocytes 0      NRBCs per 100 WBC 0      Absolute Neutrophils 2.2      Absolute Lymphocytes 0.6 (*)     Absolute Monocytes 0.5      Absolute Eosinophils 0.0      Absolute Basophils 0.0      Absolute Immature Granulocytes 0.0      Absolute NRBCs 0.0     INFLUENZA A/B, RSV, & SARS-COV2 PCR - Abnormal    Influenza A PCR Negative      Influenza B PCR Negative      RSV PCR Negative      SARS CoV2 PCR Positive (*)    TROPONIN T, HIGH SENSITIVITY - Normal    Troponin T, High Sensitivity 10         Imaging   CT Head w/o Contrast   Final Result   IMPRESSION:   1.  Normal head CT.      XR Chest 2 Views    (Results Pending)       EKG   ECG results from 09/06/24   EKG 12-lead, tracing only     Value    Systolic Blood Pressure     Diastolic Blood Pressure     Ventricular Rate 92    Atrial Rate 92    MO Interval 160    QRS Duration 72        QTc 417    P Axis 86    R AXIS 86    T Axis 92    Interpretation ECG      Sinus rhythm with Premature atrial complexes  Right atrial enlargement  Nonspecific ST abnormality  Read by Dr Beasley 1934          Independent Interpretation   Chest xray without infiltrate    ED Course      Medications Administered   Medications   sodium  chloride 0.9% BOLUS 1,000 mL (0 mLs Intravenous Stopped 9/6/24 3437)       Procedures   Procedures     Discussion of Management   None    ED Course   ED Course as of 09/07/24 1837   Fri Sep 06, 2024   2124 I initially assessed the patient and obtained the history and physical exam.        Additional Documentation  None    Medical Decision Making / Diagnosis     CMS Diagnoses: None    MIPS       None    MDM   Leon Wayne is a 69 year old male presents with not feeling well with family who interpret for him.  He smokes and is not currently taking any medications.  He has a multitude of symptoms including headache cough body aches and constipation.  He has a benign abdominal exam.  Workup reveals COVID with a negative head CT and negative chest x-ray and normal labs and normal pulse ox.  Discussed with him the findings and treatment options.  Sounds like he has been sick probably 3 to 5 days and would still be within the treatment timeframe of paxlovid but also discussed supportive care with him.  Also was given a prescription to help out with underlying constipation.  Patient given IV fluids and is feeling better.    Disposition   The patient was discharged.     Diagnosis     ICD-10-CM    1. COVID-19  U07.1       2. Constipation, unspecified constipation type  K59.00            Discharge Medications   Discharge Medication List as of 9/6/2024 11:40 PM        START taking these medications    Details   nirmatrelvir and ritonavir (PAXLOVID) 300 mg/100 mg therapy pack Take 3 tablets by mouth 2 times daily for 5 days., Disp-30 tablet, R-0, E-PrescribeDate of symptom onset: 3; Risk criteria met: Yes; Weight >40 kg Yes; Renal fxn: normal;  Drug-Drug interactions reviewed & addressed: Yes (not taking any medications)      polyethylene glycol (MIRALAX) 17 GM/Dose powder Take 17 g (1 Capful) by mouth daily., Disp-527 g, R-0, E-Prescribe      senna-docusate (SENOKOT-S/PERICOLACE) 8.6-50 MG tablet Take 1 tablet by mouth daily.,  Disp-30 tablet, R-0, E-Prescribe           Scribe Disclosure:  I, Sandra Arzate, am serving as a scribe at 9:22 PM on 9/6/2024 to document services personally performed by Jb Beasley MD based on my observations and the provider's statements to me.      Jb Beasley MD  09/07/24 9035